# Patient Record
Sex: MALE | Race: ASIAN
[De-identification: names, ages, dates, MRNs, and addresses within clinical notes are randomized per-mention and may not be internally consistent; named-entity substitution may affect disease eponyms.]

---

## 2020-01-31 ENCOUNTER — HOSPITAL ENCOUNTER (OUTPATIENT)
Dept: HOSPITAL 19 - COL.VAS | Age: 85
End: 2020-01-31
Attending: INTERNAL MEDICINE
Payer: MEDICARE

## 2020-01-31 DIAGNOSIS — N18.4: Primary | ICD-10-CM

## 2020-03-18 ENCOUNTER — HOSPITAL ENCOUNTER (OUTPATIENT)
Dept: HOSPITAL 19 - ZCOL.LAB | Age: 85
End: 2020-03-18
Attending: FAMILY MEDICINE
Payer: MEDICARE

## 2020-03-18 DIAGNOSIS — N18.4: Primary | ICD-10-CM

## 2020-03-18 LAB
ALBUMIN SERPL-MCNC: 3.5 GM/DL (ref 3.5–5)
ANION GAP SERPL CALC-SCNC: 16 MMOL/L (ref 7–16)
BASOPHILS # BLD: 0 10*3/UL (ref 0–0.2)
BASOPHILS NFR BLD AUTO: 0.3 % (ref 0–2)
BUN SERPL-MCNC: 127 MG/DL (ref 9–20)
CALCIUM SERPL-MCNC: 8.5 MG/DL (ref 8.4–10.2)
CHLORIDE SERPL-SCNC: 100 MMOL/L (ref 98–107)
CO2 SERPL-SCNC: 19 MMOL/L (ref 22–30)
CREAT SERPL-SCNC: 2.86 UMOL/L (ref 0.66–1.25)
EOSINOPHIL # BLD: 0.6 10*3/UL (ref 0–0.7)
EOSINOPHIL NFR BLD: 7.8 % (ref 0–4)
ERYTHROCYTE [DISTWIDTH] IN BLOOD BY AUTOMATED COUNT: 13.6 % (ref 11.5–14.5)
GLUCOSE SERPL-MCNC: 109 MG/DL (ref 74–106)
GRANULOCYTES # BLD AUTO: 76.8 % (ref 42.2–75.2)
HCT VFR BLD AUTO: 24.7 % (ref 42–52)
HGB BLD-MCNC: 8.3 G/DL (ref 13.5–18)
LYMPHOCYTES # BLD: 0.5 10*3/UL (ref 1.2–3.4)
LYMPHOCYTES NFR BLD: 6.3 % (ref 20–51)
MCH RBC QN AUTO: 32 PG (ref 27–31)
MCHC RBC AUTO-ENTMCNC: 34 G/DL (ref 33–37)
MCV RBC AUTO: 95 FL (ref 80–100)
MONOCYTES # BLD: 0.6 10*3/UL (ref 0.1–0.6)
MONOCYTES NFR BLD AUTO: 8.5 % (ref 1.7–9.3)
NEUTROPHILS # BLD: 5.6 10*3/UL (ref 1.4–6.5)
PHOSPHATE SERPL-MCNC: 6.4 MG/DL (ref 2.5–4.5)
PLATELET # BLD AUTO: 198 K/MM3 (ref 130–400)
PMV BLD AUTO: 11.4 FL (ref 7.4–10.4)
POTASSIUM SERPL-SCNC: 2.9 MMOL/L (ref 3.4–5)
RBC # BLD AUTO: 2.59 M/MM3 (ref 4.2–5.6)
SODIUM SERPL-SCNC: 135 MMOL/L (ref 137–145)

## 2020-04-20 ENCOUNTER — HOSPITAL ENCOUNTER (OUTPATIENT)
Dept: HOSPITAL 19 - EUO | Age: 85
Discharge: HOME | End: 2020-04-20
Attending: INTERNAL MEDICINE
Payer: MEDICARE

## 2020-04-20 VITALS — HEART RATE: 97 BPM | DIASTOLIC BLOOD PRESSURE: 77 MMHG | SYSTOLIC BLOOD PRESSURE: 153 MMHG | TEMPERATURE: 98.2 F

## 2020-04-20 VITALS — BODY MASS INDEX: 16.66 KG/M2 | WEIGHT: 91.71 LBS | HEIGHT: 62.01 IN

## 2020-04-20 DIAGNOSIS — Z79.899: ICD-10-CM

## 2020-04-20 DIAGNOSIS — N18.4: Primary | ICD-10-CM

## 2020-04-20 DIAGNOSIS — D63.1: ICD-10-CM

## 2020-04-20 LAB
HCT VFR BLD AUTO: 27.8 % (ref 42–52)
HGB BLD-MCNC: 9.2 G/DL (ref 13.5–18)

## 2020-05-15 ENCOUNTER — HOSPITAL ENCOUNTER (INPATIENT)
Dept: HOSPITAL 19 - MEDICAL | Age: 85
LOS: 15 days | Discharge: NURSING, REHAB OR LONG-TERM CARE | DRG: 286 | End: 2020-05-30
Attending: INTERNAL MEDICINE | Admitting: INTERNAL MEDICINE
Payer: MEDICARE

## 2020-05-15 VITALS — HEART RATE: 113 BPM | SYSTOLIC BLOOD PRESSURE: 129 MMHG | TEMPERATURE: 97.5 F | DIASTOLIC BLOOD PRESSURE: 69 MMHG

## 2020-05-15 VITALS — WEIGHT: 79.81 LBS | HEIGHT: 62.01 IN | BODY MASS INDEX: 14.5 KG/M2

## 2020-05-15 VITALS — TEMPERATURE: 98.9 F | DIASTOLIC BLOOD PRESSURE: 71 MMHG | HEART RATE: 102 BPM | SYSTOLIC BLOOD PRESSURE: 138 MMHG

## 2020-05-15 VITALS — DIASTOLIC BLOOD PRESSURE: 61 MMHG | HEART RATE: 107 BPM | SYSTOLIC BLOOD PRESSURE: 130 MMHG | TEMPERATURE: 98.1 F

## 2020-05-15 DIAGNOSIS — I13.2: Primary | ICD-10-CM

## 2020-05-15 DIAGNOSIS — I16.0: ICD-10-CM

## 2020-05-15 DIAGNOSIS — E87.2: ICD-10-CM

## 2020-05-15 DIAGNOSIS — I50.20: ICD-10-CM

## 2020-05-15 DIAGNOSIS — E87.6: ICD-10-CM

## 2020-05-15 DIAGNOSIS — E43: ICD-10-CM

## 2020-05-15 DIAGNOSIS — E83.39: ICD-10-CM

## 2020-05-15 DIAGNOSIS — R19.7: ICD-10-CM

## 2020-05-15 DIAGNOSIS — F03.90: ICD-10-CM

## 2020-05-15 DIAGNOSIS — Z66: ICD-10-CM

## 2020-05-15 DIAGNOSIS — N14.0: ICD-10-CM

## 2020-05-15 DIAGNOSIS — N39.0: ICD-10-CM

## 2020-05-15 DIAGNOSIS — N32.0: ICD-10-CM

## 2020-05-15 DIAGNOSIS — N40.1: ICD-10-CM

## 2020-05-15 DIAGNOSIS — R00.0: ICD-10-CM

## 2020-05-15 DIAGNOSIS — I25.5: ICD-10-CM

## 2020-05-15 DIAGNOSIS — R33.8: ICD-10-CM

## 2020-05-15 DIAGNOSIS — N18.6: ICD-10-CM

## 2020-05-15 DIAGNOSIS — Z88.8: ICD-10-CM

## 2020-05-15 DIAGNOSIS — Z87.891: ICD-10-CM

## 2020-05-15 DIAGNOSIS — I42.8: ICD-10-CM

## 2020-05-15 DIAGNOSIS — R79.89: ICD-10-CM

## 2020-05-15 DIAGNOSIS — I08.3: ICD-10-CM

## 2020-05-15 DIAGNOSIS — E87.70: ICD-10-CM

## 2020-05-15 DIAGNOSIS — I25.119: ICD-10-CM

## 2020-05-15 DIAGNOSIS — E87.1: ICD-10-CM

## 2020-05-15 PROCEDURE — C1894 INTRO/SHEATH, NON-LASER: HCPCS

## 2020-05-15 PROCEDURE — 4A023N7 MEASUREMENT OF CARDIAC SAMPLING AND PRESSURE, LEFT HEART, PERCUTANEOUS APPROACH: ICD-10-PCS

## 2020-05-15 PROCEDURE — C1769 GUIDE WIRE: HCPCS

## 2020-05-15 PROCEDURE — A9500 TC99M SESTAMIBI: HCPCS

## 2020-05-15 PROCEDURE — C1760 CLOSURE DEV, VASC: HCPCS

## 2020-05-15 PROCEDURE — B2111ZZ FLUOROSCOPY OF MULTIPLE CORONARY ARTERIES USING LOW OSMOLAR CONTRAST: ICD-10-PCS

## 2020-05-15 NOTE — NUR
Received [facility] from [facility].  Patient was
brought to room via EMS.  Did speak with son Ananth regarding patients care.
He did go to ER admissions to sign paperwork for his father.  Hearing aid sent
up for patient.  5 page reviewed with patient.  He  did state that we could
call him at any time for assistance with translation or confusion.  Fall
precautions initiated for patient.  Call light is provided.

## 2020-05-15 NOTE — NUR
Initial shift assessment done- has been resting well for the past couple
hours- does not speak much english but this nurse did call pts son to make
sure we had his meds correct in the system and to talk with his dad- patient
denies
pain at this this time- was able to say needed some water etc- o2 sats 92 % on
2.5L/nc, Lawrence with clear yellow urine-

## 2020-05-16 VITALS — SYSTOLIC BLOOD PRESSURE: 126 MMHG | DIASTOLIC BLOOD PRESSURE: 53 MMHG | HEART RATE: 114 BPM | TEMPERATURE: 97.3 F

## 2020-05-16 VITALS — TEMPERATURE: 98.3 F | DIASTOLIC BLOOD PRESSURE: 52 MMHG | HEART RATE: 116 BPM | SYSTOLIC BLOOD PRESSURE: 120 MMHG

## 2020-05-16 VITALS — DIASTOLIC BLOOD PRESSURE: 55 MMHG | HEART RATE: 112 BPM | SYSTOLIC BLOOD PRESSURE: 117 MMHG

## 2020-05-16 VITALS — DIASTOLIC BLOOD PRESSURE: 63 MMHG | HEART RATE: 118 BPM | SYSTOLIC BLOOD PRESSURE: 145 MMHG | TEMPERATURE: 98.6 F

## 2020-05-16 VITALS — HEART RATE: 114 BPM | DIASTOLIC BLOOD PRESSURE: 61 MMHG | SYSTOLIC BLOOD PRESSURE: 141 MMHG | TEMPERATURE: 98.4 F

## 2020-05-16 VITALS — TEMPERATURE: 98.7 F | HEART RATE: 120 BPM | DIASTOLIC BLOOD PRESSURE: 69 MMHG | SYSTOLIC BLOOD PRESSURE: 148 MMHG

## 2020-05-16 VITALS — SYSTOLIC BLOOD PRESSURE: 125 MMHG | DIASTOLIC BLOOD PRESSURE: 52 MMHG | HEART RATE: 114 BPM

## 2020-05-16 LAB
ALBUMIN SERPL-MCNC: 3.5 GM/DL (ref 3.5–5)
ANION GAP SERPL CALC-SCNC: 17 MMOL/L (ref 7–16)
BASOPHILS # BLD: 0 10*3/UL (ref 0–0.2)
BASOPHILS NFR BLD AUTO: 0.1 % (ref 0–2)
BUN SERPL-MCNC: 144 MG/DL (ref 9–20)
CALCIUM SERPL-MCNC: 8.9 MG/DL (ref 8.4–10.2)
CHLORIDE SERPL-SCNC: 103 MMOL/L (ref 98–107)
CO2 SERPL-SCNC: 15 MMOL/L (ref 22–30)
CREAT SERPL-SCNC: 3.56 UMOL/L (ref 0.66–1.25)
EOSINOPHIL # BLD: 0 10*3/UL (ref 0–0.7)
EOSINOPHIL NFR BLD: 0 % (ref 0–4)
ERYTHROCYTE [DISTWIDTH] IN BLOOD BY AUTOMATED COUNT: 14.2 % (ref 11.5–14.5)
GLUCOSE SERPL-MCNC: 108 MG/DL (ref 74–106)
GRANULOCYTES # BLD AUTO: 89.7 % (ref 42.2–75.2)
HBV SURFACE AB SER QL IA: <2
HCT VFR BLD AUTO: 32.9 % (ref 42–52)
HGB BLD-MCNC: 11.2 G/DL (ref 13.5–18)
IRON SERPL-MCNC: 12 UG/DL (ref 35–150)
LYMPHOCYTES # BLD: 0.3 10*3/UL (ref 1.2–3.4)
LYMPHOCYTES NFR BLD: 3.9 % (ref 20–51)
MCH RBC QN AUTO: 33 PG (ref 27–31)
MCHC RBC AUTO-ENTMCNC: 34 G/DL (ref 33–37)
MCV RBC AUTO: 96 FL (ref 80–100)
MONOCYTES # BLD: 0.5 10*3/UL (ref 0.1–0.6)
MONOCYTES NFR BLD AUTO: 5.9 % (ref 1.7–9.3)
NEUTROPHILS # BLD: 7.6 10*3/UL (ref 1.4–6.5)
PHOSPHATE SERPL-MCNC: 6.4 MG/DL (ref 2.5–4.5)
PLATELET # BLD AUTO: 185 K/MM3 (ref 130–400)
PMV BLD AUTO: 12 FL (ref 7.4–10.4)
POTASSIUM SERPL-SCNC: 2.5 MMOL/L (ref 3.4–5)
RBC # BLD AUTO: 3.44 M/MM3 (ref 4.2–5.6)
SODIUM SERPL-SCNC: 134 MMOL/L (ref 137–145)
TIBC SERPL-MCNC: 242 UG/DL (ref 261–462)
TRANSFERRIN SERPL-MCNC: 165 MG/DL (ref 180–329)

## 2020-05-16 NOTE — NUR
Quiet night- has been resting well, Left fistula site w/incision
intact, no drainage-slight
redness, good thrill,bruit.pulse. O2 at 2.5L/nc,sats 93-95% tele on, has been
tachy about 106-114 throughout the night- good output per Lawrence

## 2020-05-16 NOTE — NUR
Resting comfortably--  did drink Nephro drink- HOB up, on 3L/nc sats 93%,  now
would like to go back on Bipap- respiratory here to put on, sats 97% on Bipap,
no ther requests per pt, left wrist fistula incision intact, slight redness
noted, good thrill/bruit/pulse. Vitals stable 125/52,112,18, Tele on, Bumex
drip at 0.5mg/hr-- will start another INT to give the Nulecit infusion that
was
ordered.

## 2020-05-16 NOTE — NUR
Report given to oncoming shift.  Called son to give an update.  Assisted
patient with bed repositioning for supper.

## 2020-05-16 NOTE — NUR
When in patients room, pt pointing to chest- rubbing chest,, Tele on, with no
changes, vitals stable at 115/55,112,16- on Bipap with sats 96%,, Dr Clemente
called - when I talked with pt more it is more of a "hard to breathe " than
actual chest pain--  Pt also incontinent of loose stool at this time--
cleaned up pt, and gave a Tylenol as ordered, HOB up, respiratory here to get
him back on Biapap, Pt resting comfortably-falling back to sleep. Will put in
an order for CXR in AM per Dr. Clemente. Continues with Bumex drip of
0.5mg/hr-good output per Lawrence.

## 2020-05-16 NOTE — NUR
Patient rang call light, notified staff that he couldnt breathe.  He was noted
to have audible wheezing from doorway and was observed to be tachypnec.  Call
was placed to Dr. lCemente who instructed to stop fluids, administer IV lasix
and PO PRN hydralazine for reported blood pressure.  This was adminsitered at
0830 after placing call to Dr. Clemente at 0820, orders were read back.  0910,
Dr. Clemente was called again notifying him that condition had not changed.
Urine output had not increased, work of breathing remained the same and heart
rate was in the 140s.  Did receive order for dilaudid for air hunger, bumex
gtt, IV hydralazine.  Did request use of Bi-pap as I had consulted with RT and
he agreed.  Dilaudid and hydralazine were adminstered at 0915 and bi-pap
initiated at 0920.  VS were obtained again at 0930 and have improved, patient
is more relaxed and eyes are closed.  RT remains at bedside to monitor.  1030,
vs have improved from last check.  I have remained in contact with son to give
updates.  I did request a code status for patient and son reports that patient
is a full code.  Did notifiy Dr. Clemente of this and he instructed me to place
the order for this.

## 2020-05-17 VITALS — TEMPERATURE: 97.6 F | HEART RATE: 102 BPM | SYSTOLIC BLOOD PRESSURE: 104 MMHG | DIASTOLIC BLOOD PRESSURE: 52 MMHG

## 2020-05-17 VITALS — TEMPERATURE: 98.3 F | HEART RATE: 110 BPM | SYSTOLIC BLOOD PRESSURE: 123 MMHG | DIASTOLIC BLOOD PRESSURE: 57 MMHG

## 2020-05-17 VITALS — SYSTOLIC BLOOD PRESSURE: 110 MMHG | TEMPERATURE: 99.2 F | HEART RATE: 73 BPM | DIASTOLIC BLOOD PRESSURE: 58 MMHG

## 2020-05-17 VITALS — DIASTOLIC BLOOD PRESSURE: 57 MMHG | TEMPERATURE: 98.9 F | HEART RATE: 97 BPM | SYSTOLIC BLOOD PRESSURE: 109 MMHG

## 2020-05-17 VITALS — TEMPERATURE: 97.8 F | DIASTOLIC BLOOD PRESSURE: 53 MMHG | HEART RATE: 96 BPM | SYSTOLIC BLOOD PRESSURE: 122 MMHG

## 2020-05-17 VITALS — TEMPERATURE: 97.6 F | SYSTOLIC BLOOD PRESSURE: 129 MMHG | HEART RATE: 110 BPM | DIASTOLIC BLOOD PRESSURE: 61 MMHG

## 2020-05-17 VITALS — HEART RATE: 109 BPM | DIASTOLIC BLOOD PRESSURE: 65 MMHG | SYSTOLIC BLOOD PRESSURE: 119 MMHG | TEMPERATURE: 97.1 F

## 2020-05-17 LAB
ALBUMIN SERPL-MCNC: 3.5 GM/DL (ref 3.5–5)
ANION GAP SERPL CALC-SCNC: 17 MMOL/L (ref 7–16)
BASOPHILS # BLD: 0 10*3/UL (ref 0–0.2)
BASOPHILS NFR BLD AUTO: 0.1 % (ref 0–2)
BUN SERPL-MCNC: 165 MG/DL (ref 9–20)
CALCIUM SERPL-MCNC: 8.9 MG/DL (ref 8.4–10.2)
CHLORIDE SERPL-SCNC: 102 MMOL/L (ref 98–107)
CO2 SERPL-SCNC: 16 MMOL/L (ref 22–30)
CREAT SERPL-SCNC: 3.95 UMOL/L (ref 0.66–1.25)
EOSINOPHIL # BLD: 0 10*3/UL (ref 0–0.7)
EOSINOPHIL NFR BLD: 0 % (ref 0–4)
ERYTHROCYTE [DISTWIDTH] IN BLOOD BY AUTOMATED COUNT: 14.2 % (ref 11.5–14.5)
GLUCOSE SERPL-MCNC: 146 MG/DL (ref 74–106)
GRANULOCYTES # BLD AUTO: 89 % (ref 42.2–75.2)
HCT VFR BLD AUTO: 33.6 % (ref 42–52)
HGB BLD-MCNC: 11.5 G/DL (ref 13.5–18)
LYMPHOCYTES # BLD: 0.3 10*3/UL (ref 1.2–3.4)
LYMPHOCYTES NFR BLD: 3.1 % (ref 20–51)
MCH RBC QN AUTO: 32 PG (ref 27–31)
MCHC RBC AUTO-ENTMCNC: 34 G/DL (ref 33–37)
MCV RBC AUTO: 94 FL (ref 80–100)
MONOCYTES # BLD: 0.7 10*3/UL (ref 0.1–0.6)
MONOCYTES NFR BLD AUTO: 7.5 % (ref 1.7–9.3)
NEUTROPHILS # BLD: 7.9 10*3/UL (ref 1.4–6.5)
PHOSPHATE SERPL-MCNC: 7.3 MG/DL (ref 2.5–4.5)
PLATELET # BLD AUTO: 213 K/MM3 (ref 130–400)
PMV BLD AUTO: 12.1 FL (ref 7.4–10.4)
POTASSIUM SERPL-SCNC: 2.9 MMOL/L (ref 3.4–5)
RBC # BLD AUTO: 3.56 M/MM3 (ref 4.2–5.6)
SODIUM SERPL-SCNC: 135 MMOL/L (ref 137–145)

## 2020-05-17 NOTE — NUR
Received report from Annie at beginning of the shift, was awake and alert in
room during rounds.  He states he feels much better.  Breakfast tray later
delivered and patient stated he does not eat, only drinks milk and water.  Did
turn down the food.  Does accept Nepro supplements.  Oral medications
administered and were taken without problem.  Potassium is currently being
replaced.

## 2020-05-17 NOTE — NUR
Report given to night shift nurse.  Patient has had uneventful shift.  Did
once complain of pain from the ohara and he wished to have it removed.  I did
explain if it were removed, he would not be able to urinate.  He verbalized
understanding.

## 2020-05-17 NOTE — NUR
Incontinent of moderate loose liquid stool, cleaned up, has been resting well
tonight- on Bipap.sats 97%,, pt states" is feeling better tonight,, Tele on,
heart rate down to /min

## 2020-05-17 NOTE — NUR
PT NOT ON BIPAP AS HE IS FEELING MUCH BETTER AND ABLE TO BREATH BETTER. WILL
CONTINUE TO MONITOR AND ASSESS. BIPAP IS IN ROOM AND ON STANDBY IF NEEDED FOR
PT AT NIGHT.

## 2020-05-17 NOTE — NUR
Quiet night-- has been resting quietly , requesting a few sips of water at
times, otherwise sleeping, Bipap on

## 2020-05-18 VITALS — HEART RATE: 96 BPM | DIASTOLIC BLOOD PRESSURE: 55 MMHG | TEMPERATURE: 98.4 F | SYSTOLIC BLOOD PRESSURE: 124 MMHG

## 2020-05-18 VITALS — TEMPERATURE: 97.5 F | DIASTOLIC BLOOD PRESSURE: 34 MMHG | HEART RATE: 95 BPM | SYSTOLIC BLOOD PRESSURE: 128 MMHG

## 2020-05-18 VITALS — HEART RATE: 105 BPM | SYSTOLIC BLOOD PRESSURE: 138 MMHG | TEMPERATURE: 98 F | DIASTOLIC BLOOD PRESSURE: 52 MMHG

## 2020-05-18 VITALS — DIASTOLIC BLOOD PRESSURE: 37 MMHG | SYSTOLIC BLOOD PRESSURE: 98 MMHG | HEART RATE: 91 BPM | TEMPERATURE: 97.5 F

## 2020-05-18 LAB
ALBUMIN SERPL-MCNC: 3.6 GM/DL (ref 3.5–5)
ANION GAP SERPL CALC-SCNC: 20 MMOL/L (ref 7–16)
APTT PPP: 34 SECONDS (ref 26–37)
BUN SERPL-MCNC: 180 MG/DL (ref 9–20)
BURR CELLS BLD QL SMEAR: (no result)
CALCIUM SERPL-MCNC: 9 MG/DL (ref 8.4–10.2)
CHLORIDE SERPL-SCNC: 99 MMOL/L (ref 98–107)
CO2 SERPL-SCNC: 16 MMOL/L (ref 22–30)
CREAT SERPL-SCNC: 4.45 UMOL/L (ref 0.66–1.25)
ERYTHROCYTE [DISTWIDTH] IN BLOOD BY AUTOMATED COUNT: 14.1 % (ref 11.5–14.5)
GLUCOSE SERPL-MCNC: 138 MG/DL (ref 74–106)
HCT VFR BLD AUTO: 36 % (ref 42–52)
HGB BLD-MCNC: 12.3 G/DL (ref 13.5–18)
INR BLD: 1.2 (ref 0.8–3)
LYMPHOCYTES NFR BLD MANUAL: 4 % (ref 20–51)
MCH RBC QN AUTO: 32 PG (ref 27–31)
MCHC RBC AUTO-ENTMCNC: 34 G/DL (ref 33–37)
MCV RBC AUTO: 94 FL (ref 80–100)
MONOCYTES NFR BLD: 1 % (ref 1.7–9.3)
NEUTS BAND NFR BLD: 3 % (ref 0–10)
NEUTS SEG NFR BLD MANUAL: 92 % (ref 42–75.2)
OVALOCYTES BLD QL SMEAR: (no result)
PHOSPHATE SERPL-MCNC: 7.8 MG/DL (ref 2.5–4.5)
PLATELET # BLD AUTO: 251 K/MM3 (ref 130–400)
PLATELET BLD QL SMEAR: NORMAL
PMV BLD AUTO: 12.2 FL (ref 7.4–10.4)
POTASSIUM SERPL-SCNC: 3.1 MMOL/L (ref 3.4–5)
PROTHROMBIN TIME: 13.5 SECONDS (ref 9.7–12.8)
RBC # BLD AUTO: 3.82 M/MM3 (ref 4.2–5.6)
SODIUM SERPL-SCNC: 135 MMOL/L (ref 137–145)

## 2020-05-18 PROCEDURE — B5181ZA FLUOROSCOPY OF SUPERIOR VENA CAVA USING LOW OSMOLAR CONTRAST, GUIDANCE: ICD-10-PCS | Performed by: SURGERY

## 2020-05-18 PROCEDURE — 02HV33Z INSERTION OF INFUSION DEVICE INTO SUPERIOR VENA CAVA, PERCUTANEOUS APPROACH: ICD-10-PCS | Performed by: SURGERY

## 2020-05-18 PROCEDURE — 0JH63XZ INSERTION OF TUNNELED VASCULAR ACCESS DEVICE INTO CHEST SUBCUTANEOUS TISSUE AND FASCIA, PERCUTANEOUS APPROACH: ICD-10-PCS | Performed by: SURGERY

## 2020-05-18 NOTE — NUR
spoke with CIARRA Jarquin who advised patient has been confused and
that his son, Suleiman (ph#[phone]) is the best point of contact. SW
contacted Suleiman who advised patient lives in [place] with his wife, Shree Lema
(ph#[phone]). Patient sees Dr. Caldwell for primary care and Bill picks
up patient's medications from [facility]. Suleiman reports he assists patient
and his wife with setting up their medications in a pill organizer. Suleiman
reports patient takes his medications independently once set up in the
organizer. Suleiman reports he checks in on patient and his wife multiple times
daily and helps patient's wife with meals. Suleiman reports patient is normally
very mobile. Suleiman reports he is patient's DPOA and that he will bring in
paperwork as soon as possible. uSleiman reports patient's first language is
Mandarin Chinese but that he normally speaks fluent English as well. Suleiman
advised that as a result of patient's dementia, he often prefers to speak his
first language. Suleiman is trying to locate patient's second hearing aide to
assist with communication. Suleiman advised having an  may be more
beneficial for patient at this time or call Suleiman on speaker phone to speak
Mandarin Chinese to patient. Suleiman advised patient does not have home oxygen at
this time but may need it upon discharge. Suleiman advised he has already been in
contact with [employer] about setting up home oxygen. SW to continue to
monitor. Suleiman also reports patient and his wife have [facility] although currently it is patient's wife that is receiving PT/OT/ST.
Suleiman advised he is in the process of setting up private duty services through
[facility]. Suleiman advised plan is for patient to return home upon discharge
with [facility]. SW to continue to follow.

## 2020-05-18 NOTE — NUR
Up to bedside commode and returned to bed. Assessment complete. Lungs clear.
Heart sounds normal. Bowels active. Pulses strong throughout. No edema noted.
Bumex gtt to right upper arm without complications. Right chest dialysis cath
CDI. Left forearm fistula bruit and thrill present. Reports pain at site.
Provided with PRN tylenol. Denies needs at this time. Call light in reach.
Will monitor.

## 2020-05-18 NOTE — NUR
Patient noted to call out several times asking about his bags, pants, etc.
Patient then pulled out one of his IV's. The bed alarm on his bed wasn't
working, so patient was moved to a closer room to the nurses station. IV
to right forearm patent. IV bumex continues. New AV Fistula noted to left
forearm. Incision edges well approximated. Patient is hard of hearing. Noted
to continue on 3L of oxygen until respiratory therapy put patient on BiPap.
Patient has been NPO since midnight. Will continue to monitor.

## 2020-05-19 VITALS — TEMPERATURE: 97.6 F | HEART RATE: 104 BPM | SYSTOLIC BLOOD PRESSURE: 117 MMHG | DIASTOLIC BLOOD PRESSURE: 42 MMHG

## 2020-05-19 VITALS — SYSTOLIC BLOOD PRESSURE: 118 MMHG | TEMPERATURE: 97.3 F | DIASTOLIC BLOOD PRESSURE: 95 MMHG | HEART RATE: 96 BPM

## 2020-05-19 VITALS — SYSTOLIC BLOOD PRESSURE: 130 MMHG | TEMPERATURE: 98.1 F | HEART RATE: 105 BPM | DIASTOLIC BLOOD PRESSURE: 39 MMHG

## 2020-05-19 VITALS — HEART RATE: 101 BPM | SYSTOLIC BLOOD PRESSURE: 122 MMHG | TEMPERATURE: 98.8 F | DIASTOLIC BLOOD PRESSURE: 44 MMHG

## 2020-05-19 LAB
ALBUMIN SERPL-MCNC: 3.3 GM/DL (ref 3.5–5)
ANION GAP SERPL CALC-SCNC: 12 MMOL/L (ref 7–16)
BASOPHILS # BLD: 0 10*3/UL (ref 0–0.2)
BASOPHILS NFR BLD AUTO: 0.1 % (ref 0–2)
BUN SERPL-MCNC: 120 MG/DL (ref 9–20)
CALCIUM SERPL-MCNC: 8.8 MG/DL (ref 8.4–10.2)
CHLORIDE SERPL-SCNC: 101 MMOL/L (ref 98–107)
CO2 SERPL-SCNC: 24 MMOL/L (ref 22–30)
CREAT SERPL-SCNC: 3.44 UMOL/L (ref 0.66–1.25)
EOSINOPHIL # BLD: 0 10*3/UL (ref 0–0.7)
EOSINOPHIL NFR BLD: 0.1 % (ref 0–4)
ERYTHROCYTE [DISTWIDTH] IN BLOOD BY AUTOMATED COUNT: 14.2 % (ref 11.5–14.5)
GLUCOSE SERPL-MCNC: 126 MG/DL (ref 74–106)
GRANULOCYTES # BLD AUTO: 87.7 % (ref 42.2–75.2)
HCT VFR BLD AUTO: 33 % (ref 42–52)
HGB BLD-MCNC: 11.3 G/DL (ref 13.5–18)
LYMPHOCYTES # BLD: 0.3 10*3/UL (ref 1.2–3.4)
LYMPHOCYTES NFR BLD: 3.4 % (ref 20–51)
MCH RBC QN AUTO: 33 PG (ref 27–31)
MCHC RBC AUTO-ENTMCNC: 34 G/DL (ref 33–37)
MCV RBC AUTO: 95 FL (ref 80–100)
MONOCYTES # BLD: 0.8 10*3/UL (ref 0.1–0.6)
MONOCYTES NFR BLD AUTO: 8.3 % (ref 1.7–9.3)
NEUTROPHILS # BLD: 8.7 10*3/UL (ref 1.4–6.5)
PHOSPHATE SERPL-MCNC: 5.4 MG/DL (ref 2.5–4.5)
PLATELET # BLD AUTO: 236 K/MM3 (ref 130–400)
PMV BLD AUTO: 12.1 FL (ref 7.4–10.4)
POTASSIUM SERPL-SCNC: 2.9 MMOL/L (ref 3.4–5)
RBC # BLD AUTO: 3.46 M/MM3 (ref 4.2–5.6)
SODIUM SERPL-SCNC: 137 MMOL/L (ref 137–145)

## 2020-05-19 PROCEDURE — 5A1D70Z PERFORMANCE OF URINARY FILTRATION, INTERMITTENT, LESS THAN 6 HOURS PER DAY: ICD-10-PCS | Performed by: INTERNAL MEDICINE

## 2020-05-19 NOTE — NUR
Resting in bed. Assessment complete. Lungs clear. Heart sounds normal. Bowels
active x4. Pulses present throughout. No edema noted at this time. INT right
upper arm flushed without complications. Denies pain. Denies needs at this
time. Call light in reach. Will monitor.

## 2020-05-19 NOTE — NUR
Patient remained on bumex gtt throughout night. Uneventful night. Resting in
bed this AM. Call light in reach.

## 2020-05-19 NOTE — NUR
Patient had incontienent episode of diarrhea. Provided with PRN immodium at
patient request. Cares provided along with ohara care.

## 2020-05-19 NOTE — NUR
Patient reports chest pain. Blood pressure 98/34. Spoke with Dr. Clemente-observe at this time. Gave PRN order for dilaudid 0.25 to 0.5mg Q2H but
use carefully with low blood pressures. Patient updated. Will monitor.

## 2020-05-19 NOTE — NUR
Assessment completed, alert/oriented, vital signs stable, denies pain, heart
RRR, lungs CTA, new left arm fistula +bruit/thrill, right jugular HD cath site
looks good/ no redness or oozing noted, ohara patent with clear urine/ Urology
notified this morning of consultation for outlet obstruction, patient has
eaten small amount of breakfast, will be going to dialysis this morning,
denies other needs at this time

## 2020-05-20 VITALS — TEMPERATURE: 97.9 F | DIASTOLIC BLOOD PRESSURE: 39 MMHG | HEART RATE: 95 BPM | SYSTOLIC BLOOD PRESSURE: 125 MMHG

## 2020-05-20 VITALS — SYSTOLIC BLOOD PRESSURE: 116 MMHG | DIASTOLIC BLOOD PRESSURE: 49 MMHG | HEART RATE: 74 BPM

## 2020-05-20 VITALS — SYSTOLIC BLOOD PRESSURE: 120 MMHG | DIASTOLIC BLOOD PRESSURE: 39 MMHG | HEART RATE: 88 BPM

## 2020-05-20 VITALS — DIASTOLIC BLOOD PRESSURE: 43 MMHG | TEMPERATURE: 98.4 F | SYSTOLIC BLOOD PRESSURE: 112 MMHG | HEART RATE: 95 BPM

## 2020-05-20 VITALS — SYSTOLIC BLOOD PRESSURE: 108 MMHG | HEART RATE: 92 BPM | DIASTOLIC BLOOD PRESSURE: 33 MMHG

## 2020-05-20 VITALS — DIASTOLIC BLOOD PRESSURE: 33 MMHG | TEMPERATURE: 98.5 F | HEART RATE: 92 BPM | SYSTOLIC BLOOD PRESSURE: 108 MMHG

## 2020-05-20 VITALS — DIASTOLIC BLOOD PRESSURE: 56 MMHG | HEART RATE: 82 BPM | SYSTOLIC BLOOD PRESSURE: 114 MMHG

## 2020-05-20 VITALS — SYSTOLIC BLOOD PRESSURE: 118 MMHG | HEART RATE: 84 BPM | DIASTOLIC BLOOD PRESSURE: 58 MMHG

## 2020-05-20 VITALS — HEART RATE: 81 BPM | DIASTOLIC BLOOD PRESSURE: 53 MMHG | SYSTOLIC BLOOD PRESSURE: 110 MMHG

## 2020-05-20 VITALS — TEMPERATURE: 98.1 F | SYSTOLIC BLOOD PRESSURE: 135 MMHG | HEART RATE: 92 BPM | DIASTOLIC BLOOD PRESSURE: 52 MMHG

## 2020-05-20 VITALS — HEART RATE: 91 BPM | TEMPERATURE: 97.8 F | SYSTOLIC BLOOD PRESSURE: 116 MMHG | DIASTOLIC BLOOD PRESSURE: 69 MMHG

## 2020-05-20 VITALS — SYSTOLIC BLOOD PRESSURE: 114 MMHG | DIASTOLIC BLOOD PRESSURE: 60 MMHG | HEART RATE: 89 BPM

## 2020-05-20 VITALS — HEART RATE: 81 BPM | SYSTOLIC BLOOD PRESSURE: 119 MMHG | DIASTOLIC BLOOD PRESSURE: 54 MMHG

## 2020-05-20 VITALS — SYSTOLIC BLOOD PRESSURE: 139 MMHG | DIASTOLIC BLOOD PRESSURE: 41 MMHG

## 2020-05-20 LAB
ALBUMIN SERPL-MCNC: 3.2 GM/DL (ref 3.5–5)
ANION GAP SERPL CALC-SCNC: 9 MMOL/L (ref 7–16)
BASOPHILS # BLD: 0 10*3/UL (ref 0–0.2)
BASOPHILS NFR BLD AUTO: 0.2 % (ref 0–2)
BUN SERPL-MCNC: 68 MG/DL (ref 9–20)
CALCIUM SERPL-MCNC: 8.3 MG/DL (ref 8.4–10.2)
CHLORIDE SERPL-SCNC: 103 MMOL/L (ref 98–107)
CO2 SERPL-SCNC: 24 MMOL/L (ref 22–30)
CREAT SERPL-SCNC: 2.87 UMOL/L (ref 0.66–1.25)
EOSINOPHIL # BLD: 0.1 10*3/UL (ref 0–0.7)
EOSINOPHIL NFR BLD: 0.7 % (ref 0–4)
ERYTHROCYTE [DISTWIDTH] IN BLOOD BY AUTOMATED COUNT: 14.4 % (ref 11.5–14.5)
GLUCOSE SERPL-MCNC: 121 MG/DL (ref 74–106)
GRANULOCYTES # BLD AUTO: 82.2 % (ref 42.2–75.2)
HCT VFR BLD AUTO: 33.6 % (ref 42–52)
HGB BLD-MCNC: 11.3 G/DL (ref 13.5–18)
LYMPHOCYTES # BLD: 0.5 10*3/UL (ref 1.2–3.4)
LYMPHOCYTES NFR BLD: 6.1 % (ref 20–51)
MCH RBC QN AUTO: 33 PG (ref 27–31)
MCHC RBC AUTO-ENTMCNC: 34 G/DL (ref 33–37)
MCV RBC AUTO: 98 FL (ref 80–100)
MONOCYTES # BLD: 0.9 10*3/UL (ref 0.1–0.6)
MONOCYTES NFR BLD AUTO: 10.4 % (ref 1.7–9.3)
NEUTROPHILS # BLD: 6.8 10*3/UL (ref 1.4–6.5)
PHOSPHATE SERPL-MCNC: 4.5 MG/DL (ref 2.5–4.5)
PLATELET # BLD AUTO: 194 K/MM3 (ref 130–400)
PMV BLD AUTO: 12.1 FL (ref 7.4–10.4)
POTASSIUM SERPL-SCNC: 3.6 MMOL/L (ref 3.4–5)
RBC # BLD AUTO: 3.43 M/MM3 (ref 4.2–5.6)
SODIUM SERPL-SCNC: 135 MMOL/L (ref 137–145)

## 2020-05-20 NOTE — NUR
Patient is still off the floor and will go straight from stress test to
Express unit to have his FELECIA done

## 2020-05-20 NOTE — NUR
Patient arrived back from FELECIA at this time, alert/oriented, vital signs
stable, denies needs ambulatory

## 2020-05-20 NOTE — NUR
Resting in bed. Assessment complete. Lungs clear. Heart murmur heard. Bowels
active x4. Pulses strong throughout. No edema noted. INT right upper arm
flushed without complications. Right chest dialysis access-CDI. Left forearm
fistula present-bruit and thrill. Denies pain. Lawrence to dependent drainage.
Clear yellow urine. Denies needs at this time. Call light in reach.

## 2020-05-20 NOTE — NUR
Patient says heart hurting. Points to epigastric/ABD region. BP systolic
continues to be 102. Concerned with giving dilaudid. Patient suggested tylenol
or aspirin. Given tylenol. Will monitor

## 2020-05-20 NOTE — NUR
Patient had episodes of chest pain throughout night. Resolved with tylenol and
redirection. Patient voiced concern with "bad heart." Otherwise uneventful
night. Resting in bed this AM. Call light in reach.

## 2020-05-21 VITALS — TEMPERATURE: 97.5 F | SYSTOLIC BLOOD PRESSURE: 97 MMHG | DIASTOLIC BLOOD PRESSURE: 22 MMHG | HEART RATE: 79 BPM

## 2020-05-21 VITALS — SYSTOLIC BLOOD PRESSURE: 110 MMHG | DIASTOLIC BLOOD PRESSURE: 29 MMHG | HEART RATE: 77 BPM | TEMPERATURE: 98.6 F

## 2020-05-21 VITALS — HEART RATE: 84 BPM | DIASTOLIC BLOOD PRESSURE: 36 MMHG | SYSTOLIC BLOOD PRESSURE: 102 MMHG | TEMPERATURE: 97.3 F

## 2020-05-21 VITALS — HEART RATE: 86 BPM | TEMPERATURE: 97.3 F | SYSTOLIC BLOOD PRESSURE: 136 MMHG | DIASTOLIC BLOOD PRESSURE: 32 MMHG

## 2020-05-21 VITALS — DIASTOLIC BLOOD PRESSURE: 27 MMHG | TEMPERATURE: 97.7 F | SYSTOLIC BLOOD PRESSURE: 117 MMHG | HEART RATE: 76 BPM

## 2020-05-21 LAB
ALBUMIN SERPL-MCNC: 3.3 GM/DL (ref 3.5–5)
ANION GAP SERPL CALC-SCNC: 13 MMOL/L (ref 7–16)
BASOPHILS # BLD: 0 10*3/UL (ref 0–0.2)
BASOPHILS NFR BLD AUTO: 0.1 % (ref 0–2)
BUN SERPL-MCNC: 95 MG/DL (ref 9–20)
CALCIUM SERPL-MCNC: 8.6 MG/DL (ref 8.4–10.2)
CHLORIDE SERPL-SCNC: 102 MMOL/L (ref 98–107)
CO2 SERPL-SCNC: 22 MMOL/L (ref 22–30)
CREAT SERPL-SCNC: 3.75 UMOL/L (ref 0.66–1.25)
EOSINOPHIL # BLD: 0.1 10*3/UL (ref 0–0.7)
EOSINOPHIL NFR BLD: 0.8 % (ref 0–4)
ERYTHROCYTE [DISTWIDTH] IN BLOOD BY AUTOMATED COUNT: 13.9 % (ref 11.5–14.5)
GLUCOSE SERPL-MCNC: 83 MG/DL (ref 74–106)
GRANULOCYTES # BLD AUTO: 85.6 % (ref 42.2–75.2)
HCT VFR BLD AUTO: 35.7 % (ref 42–52)
HGB BLD-MCNC: 11.8 G/DL (ref 13.5–18)
LYMPHOCYTES # BLD: 0.5 10*3/UL (ref 1.2–3.4)
LYMPHOCYTES NFR BLD: 5.3 % (ref 20–51)
MCH RBC QN AUTO: 33 PG (ref 27–31)
MCHC RBC AUTO-ENTMCNC: 33 G/DL (ref 33–37)
MCV RBC AUTO: 99 FL (ref 80–100)
MONOCYTES # BLD: 0.8 10*3/UL (ref 0.1–0.6)
MONOCYTES NFR BLD AUTO: 7.9 % (ref 1.7–9.3)
NEUTROPHILS # BLD: 8.7 10*3/UL (ref 1.4–6.5)
PHOSPHATE SERPL-MCNC: 7 MG/DL (ref 2.5–4.5)
PLATELET # BLD AUTO: 206 K/MM3 (ref 130–400)
PMV BLD AUTO: 12.4 FL (ref 7.4–10.4)
POTASSIUM SERPL-SCNC: 3.7 MMOL/L (ref 3.4–5)
RBC # BLD AUTO: 3.61 M/MM3 (ref 4.2–5.6)
SODIUM SERPL-SCNC: 137 MMOL/L (ref 137–145)

## 2020-05-21 NOTE — NUR
followed up with Suleiman to review discharge plan. DONTRELL reviewed
PT/OT notes with Suleiman along with options for post acute rehab. Suleiman discussed
that patient's wife has [facility] and it has been recommended she
have 24 hour supervision and support. Suleiman advised he has been with his mom as
much as possible and is working with [facility] to obtain additional
services in home. Suleiman would like to speak with patient's primary care
physician, Dr. Caldwell to get his input on post acute rehab options. DONTRELL
discussed options including Inpatient Rehab and SNFs. Swing Bed would not be
an option for patient at this time as he requires dialysis. Suleiman reports he
will follow up with DONTRELL on where to send referrals. DONTRELL contacted Hanna at
[facility] to provide update. Hanna advised they have a social
services consult in for patient's wife and they are looking into more private
duty services for her as they do not feel she should be home alone at this
time. DONTRELL to continue to follow.

## 2020-05-21 NOTE — NUR
Assessment complete. Resting in bed. C/O chest pain. Previous nurse admin PRN
acetaminophen @1730. Nephro shake given. Denies other needs at this time.

## 2020-05-21 NOTE — NUR
Patient is resting in bed, breakfast tray is available.  States he can't eat
as he doesn't have his teeth.  He did have oatmeal on his tray and when shown
nodded his head as if he could eat that.  He declined the ground sausage,
gravy and scrambled eggs.  States he can only drink.  Was offered nepro and
ice water and he did start to drink the supplement.  Received call from
dialysis nurse who is ready for patient to have his treatment.  Was assisted
to [facility] and taken to dialysis at that time.

## 2020-05-21 NOTE — NUR
Patient delivered dinner tray, encouraged soft foods like mashed potatoes,
will take small bites.  Denies pain after receiving APAP.  Consent for heart
cath scheduled for tomorrow received from son, Ananth Pearson, VIA phone and
verbally consented to a second nurse as well.

## 2020-05-21 NOTE — NUR
Received call at 1030 regarding a critical troponin of 0.634, notified Dr Clemente who instructed me to reach out to cardiology which I did speak to
Jeni.  She said Dr. Dsouza will still continue with heparin and serial
troponins would be monitored.  1400 troponin has decreased to 0.568.

## 2020-05-22 VITALS — SYSTOLIC BLOOD PRESSURE: 106 MMHG | DIASTOLIC BLOOD PRESSURE: 43 MMHG | HEART RATE: 73 BPM

## 2020-05-22 VITALS — HEART RATE: 83 BPM | TEMPERATURE: 98.3 F | SYSTOLIC BLOOD PRESSURE: 120 MMHG | DIASTOLIC BLOOD PRESSURE: 51 MMHG

## 2020-05-22 VITALS — HEART RATE: 82 BPM | SYSTOLIC BLOOD PRESSURE: 123 MMHG | TEMPERATURE: 98.1 F | DIASTOLIC BLOOD PRESSURE: 51 MMHG

## 2020-05-22 VITALS — HEART RATE: 79 BPM | SYSTOLIC BLOOD PRESSURE: 113 MMHG | TEMPERATURE: 98.5 F | DIASTOLIC BLOOD PRESSURE: 47 MMHG

## 2020-05-22 VITALS — DIASTOLIC BLOOD PRESSURE: 47 MMHG | SYSTOLIC BLOOD PRESSURE: 96 MMHG | TEMPERATURE: 98.4 F | HEART RATE: 79 BPM

## 2020-05-22 VITALS — TEMPERATURE: 97.4 F | DIASTOLIC BLOOD PRESSURE: 65 MMHG | SYSTOLIC BLOOD PRESSURE: 125 MMHG | HEART RATE: 91 BPM

## 2020-05-22 VITALS — SYSTOLIC BLOOD PRESSURE: 113 MMHG | DIASTOLIC BLOOD PRESSURE: 48 MMHG | TEMPERATURE: 98 F | HEART RATE: 85 BPM

## 2020-05-22 VITALS — DIASTOLIC BLOOD PRESSURE: 43 MMHG | HEART RATE: 83 BPM | SYSTOLIC BLOOD PRESSURE: 110 MMHG | TEMPERATURE: 98.5 F

## 2020-05-22 VITALS — DIASTOLIC BLOOD PRESSURE: 51 MMHG | HEART RATE: 94 BPM | SYSTOLIC BLOOD PRESSURE: 95 MMHG

## 2020-05-22 VITALS — TEMPERATURE: 98 F | SYSTOLIC BLOOD PRESSURE: 119 MMHG | DIASTOLIC BLOOD PRESSURE: 46 MMHG | HEART RATE: 88 BPM

## 2020-05-22 VITALS — DIASTOLIC BLOOD PRESSURE: 47 MMHG | TEMPERATURE: 97.9 F | HEART RATE: 83 BPM | SYSTOLIC BLOOD PRESSURE: 100 MMHG

## 2020-05-22 VITALS — TEMPERATURE: 98.6 F | HEART RATE: 92 BPM | DIASTOLIC BLOOD PRESSURE: 56 MMHG | SYSTOLIC BLOOD PRESSURE: 114 MMHG

## 2020-05-22 VITALS — DIASTOLIC BLOOD PRESSURE: 43 MMHG | HEART RATE: 82 BPM | SYSTOLIC BLOOD PRESSURE: 112 MMHG | TEMPERATURE: 98 F

## 2020-05-22 VITALS — SYSTOLIC BLOOD PRESSURE: 126 MMHG | DIASTOLIC BLOOD PRESSURE: 52 MMHG | TEMPERATURE: 97.9 F | HEART RATE: 92 BPM

## 2020-05-22 VITALS — TEMPERATURE: 97.5 F | SYSTOLIC BLOOD PRESSURE: 106 MMHG | HEART RATE: 93 BPM | DIASTOLIC BLOOD PRESSURE: 54 MMHG

## 2020-05-22 VITALS — HEART RATE: 86 BPM | TEMPERATURE: 97.6 F | DIASTOLIC BLOOD PRESSURE: 41 MMHG | SYSTOLIC BLOOD PRESSURE: 133 MMHG

## 2020-05-22 VITALS — DIASTOLIC BLOOD PRESSURE: 53 MMHG | SYSTOLIC BLOOD PRESSURE: 121 MMHG | HEART RATE: 83 BPM | TEMPERATURE: 98.3 F

## 2020-05-22 LAB
ANION GAP SERPL CALC-SCNC: 10 MMOL/L (ref 7–16)
APTT PPP: 60.1 SECONDS (ref 26–37)
BUN SERPL-MCNC: 53 MG/DL (ref 9–20)
CALCIUM SERPL-MCNC: 8.3 MG/DL (ref 8.4–10.2)
CHLORIDE SERPL-SCNC: 104 MMOL/L (ref 98–107)
CO2 SERPL-SCNC: 23 MMOL/L (ref 22–30)
CREAT SERPL-SCNC: 2.7 UMOL/L (ref 0.66–1.25)
ERYTHROCYTE [DISTWIDTH] IN BLOOD BY AUTOMATED COUNT: 13.7 % (ref 11.5–14.5)
GLUCOSE SERPL-MCNC: 93 MG/DL (ref 74–106)
HCT VFR BLD AUTO: 35 % (ref 42–52)
HGB BLD-MCNC: 11.6 G/DL (ref 13.5–18)
INR BLD: 1.3 (ref 0.8–3)
MCH RBC QN AUTO: 33 PG (ref 27–31)
MCHC RBC AUTO-ENTMCNC: 33 G/DL (ref 33–37)
MCV RBC AUTO: 99 FL (ref 80–100)
PLATELET # BLD AUTO: 151 K/MM3 (ref 130–400)
PMV BLD AUTO: 12.7 FL (ref 7.4–10.4)
POTASSIUM SERPL-SCNC: 4.2 MMOL/L (ref 3.4–5)
PROTHROMBIN TIME: 14 SECONDS (ref 9.7–12.8)
RBC # BLD AUTO: 3.53 M/MM3 (ref 4.2–5.6)
SODIUM SERPL-SCNC: 136 MMOL/L (ref 137–145)

## 2020-05-22 NOTE — NUR
contacted patient's son, Suleiman to follow up on discharge plan.
Suleiman would like referral sent to [facility]. SW contacted Chelsea at
[facility] and faxed referral. SW also spoke with patient's RN, Delta about
ordering COVID testing which is required by SNF placements. SW to continue to
follow.

## 2020-05-22 NOTE — NUR
SEE MERGE DOCUMENTATION FOR MEDICATION  ADMINISTRATION  AND  INTRA/POST
PROCEDURE SEDATION ASSESSMENTS.

## 2020-05-22 NOTE — NUR
PT RETURNED FROM HEART CATH @ 1215. ACCESS SITE TO RT GROIN, ANGIOSEAL WITH
GAUZE AND TEGADERM PRESENT. DIRED BLOOD PRESENT AROUND DRESSING BUT NO ACTIVE
BLEEDING. PULSES PALPABLE. AOX3. DENIES PAIN. DOC IN HEART CATH CALLED DPOA
SON TO NOTIFY OF RESULTS. PT INSTRUCTED TO LAY FLAT. IV TO RUE INTACT.

## 2020-05-23 VITALS — DIASTOLIC BLOOD PRESSURE: 33 MMHG | TEMPERATURE: 97.5 F | SYSTOLIC BLOOD PRESSURE: 87 MMHG | HEART RATE: 69 BPM

## 2020-05-23 VITALS — HEART RATE: 82 BPM | SYSTOLIC BLOOD PRESSURE: 100 MMHG | DIASTOLIC BLOOD PRESSURE: 47 MMHG | TEMPERATURE: 97.7 F

## 2020-05-23 VITALS — TEMPERATURE: 98 F | DIASTOLIC BLOOD PRESSURE: 42 MMHG | HEART RATE: 65 BPM | SYSTOLIC BLOOD PRESSURE: 104 MMHG

## 2020-05-23 VITALS — HEART RATE: 91 BPM | DIASTOLIC BLOOD PRESSURE: 45 MMHG | TEMPERATURE: 98.3 F | SYSTOLIC BLOOD PRESSURE: 100 MMHG

## 2020-05-23 VITALS — DIASTOLIC BLOOD PRESSURE: 44 MMHG | TEMPERATURE: 97.9 F | SYSTOLIC BLOOD PRESSURE: 109 MMHG | HEART RATE: 71 BPM

## 2020-05-23 VITALS — HEART RATE: 73 BPM | TEMPERATURE: 97.3 F | DIASTOLIC BLOOD PRESSURE: 44 MMHG | SYSTOLIC BLOOD PRESSURE: 111 MMHG

## 2020-05-23 VITALS — SYSTOLIC BLOOD PRESSURE: 124 MMHG | HEART RATE: 82 BPM | DIASTOLIC BLOOD PRESSURE: 52 MMHG | TEMPERATURE: 98.2 F

## 2020-05-23 VITALS — DIASTOLIC BLOOD PRESSURE: 54 MMHG | HEART RATE: 78 BPM | SYSTOLIC BLOOD PRESSURE: 139 MMHG | TEMPERATURE: 97.3 F

## 2020-05-23 LAB
ERYTHROCYTE [DISTWIDTH] IN BLOOD BY AUTOMATED COUNT: 13.8 % (ref 11.5–14.5)
HCT VFR BLD AUTO: 34.3 % (ref 42–52)
HGB BLD-MCNC: 11.2 G/DL (ref 13.5–18)
MCH RBC QN AUTO: 32 PG (ref 27–31)
MCHC RBC AUTO-ENTMCNC: 33 G/DL (ref 33–37)
MCV RBC AUTO: 99 FL (ref 80–100)
PLATELET # BLD AUTO: 176 K/MM3 (ref 130–400)
PMV BLD AUTO: 12.5 FL (ref 7.4–10.4)
RBC # BLD AUTO: 3.47 M/MM3 (ref 4.2–5.6)

## 2020-05-23 NOTE — NUR
Sejal received a call from patients son "Ananth," [phone] who expressed
concern about father. Son indicated that he has been unable to speak to his
father due to difficulty hearing, and that his father has been on a modified
diet due to not having his dentures. SEJAL spoke with patients nurse, and called
patients son back, and encouraged hi to bring patients dentures and hearing
aids, which son indicated he would have to locate. Sw provided support and
validated. Patient asked to be contacted about status and provided the above
listed phone number.

## 2020-05-23 NOTE — NUR
PATIENT HAD AN UNEVENTFUL NIGHT. PATIENT TOOK HIS HS PILLS WITHOUT DIFFICULTY.
PATIENT WAS CLEANED UP BY THE NURSE AIDE. PATIENT DID NOT HAVE DIALYSIS ON
5/22/20 SO ACCORDING TO THE PROGRESS NOTE, 05/23/20 HE IS SUPPOSE TO HAVE
DIALYSIS. PATIENT IS LOOKING AT PLACEMENT AT [facility] ACCORDING TO
DISCHARGE PLANNING NOTES. WILL UPDATE DAY SHIFT UPON THEIR ARRIVAL

## 2020-05-24 VITALS — SYSTOLIC BLOOD PRESSURE: 113 MMHG | HEART RATE: 77 BPM | TEMPERATURE: 98 F | DIASTOLIC BLOOD PRESSURE: 43 MMHG

## 2020-05-24 VITALS — DIASTOLIC BLOOD PRESSURE: 36 MMHG | SYSTOLIC BLOOD PRESSURE: 90 MMHG

## 2020-05-24 VITALS — TEMPERATURE: 99.1 F | HEART RATE: 75 BPM | SYSTOLIC BLOOD PRESSURE: 95 MMHG | DIASTOLIC BLOOD PRESSURE: 16 MMHG

## 2020-05-24 VITALS — SYSTOLIC BLOOD PRESSURE: 114 MMHG | DIASTOLIC BLOOD PRESSURE: 42 MMHG | TEMPERATURE: 97.9 F | HEART RATE: 80 BPM

## 2020-05-24 VITALS — SYSTOLIC BLOOD PRESSURE: 98 MMHG | TEMPERATURE: 97.6 F | HEART RATE: 74 BPM | DIASTOLIC BLOOD PRESSURE: 34 MMHG

## 2020-05-24 LAB
ALBUMIN SERPL-MCNC: 3.1 GM/DL (ref 3.5–5)
ANION GAP SERPL CALC-SCNC: 12 MMOL/L (ref 7–16)
BUN SERPL-MCNC: 88 MG/DL (ref 9–20)
CALCIUM SERPL-MCNC: 8.3 MG/DL (ref 8.4–10.2)
CHLORIDE SERPL-SCNC: 102 MMOL/L (ref 98–107)
CO2 SERPL-SCNC: 20 MMOL/L (ref 22–30)
CREAT SERPL-SCNC: 4.32 UMOL/L (ref 0.66–1.25)
GLUCOSE SERPL-MCNC: 102 MG/DL (ref 74–106)
PHOSPHATE SERPL-MCNC: 5.7 MG/DL (ref 2.5–4.5)
POTASSIUM SERPL-SCNC: 5 MMOL/L (ref 3.4–5)
SODIUM SERPL-SCNC: 133 MMOL/L (ref 137–145)

## 2020-05-24 NOTE — NUR
Patient has rested well throughout the night. Lawrence continues to drain clear,
yellow urine. Dialysis catheter noted to right chest. Left wrist fistula
noted. Incision is clean, dry, and intact. INT to right AC flushes with ease.
Patient denies any pain. Patient's son dropped off hearing aides and dentures
this evening. Patient encouraged to eat. Patient noted to be asleep at the
time when his son dropped belongings off. Will continue to monitor

## 2020-05-24 NOTE — NUR
Resting in bed. Assessment complete. Lungs diminished throughout. Patient
denies shortness of breath. Heart murmur heard. Bowels active. No edema noted.
INT right upper arm without complications. Right chest dialysis cath CDI. Left
forearm fistula bruit and thrill present. Patient blood pressures hypotensive.
Spoke with Dr. Bryn marie to holding lopressor and bumex tonight and resume
in morning. Patient denies other needs at this time. Call light in reach.

## 2020-05-24 NOTE — NUR
PT RETURNED FROM DIALYSIS @ 1045. LEFT BEFORE SHIFT CHANGE. AOX4. REPORTS
FATIGUE. DIALYSIS NURSE REPORTS PT HAD AN EPISODE OF SOB WITH MOANING BUT
WITHOUT CHEST PAIN AT END OF DIALYSIS. PT CURRENTLY DENIES ANY PAIN OR SOB.
DRANK HALF OF NEPRO SUPPLEMENT AND REFUSED BREAKFAST.

## 2020-05-25 VITALS — SYSTOLIC BLOOD PRESSURE: 71 MMHG | TEMPERATURE: 97.6 F | DIASTOLIC BLOOD PRESSURE: 21 MMHG | HEART RATE: 72 BPM

## 2020-05-25 VITALS — TEMPERATURE: 97.7 F | DIASTOLIC BLOOD PRESSURE: 37 MMHG | HEART RATE: 78 BPM | SYSTOLIC BLOOD PRESSURE: 114 MMHG

## 2020-05-25 VITALS — DIASTOLIC BLOOD PRESSURE: 40 MMHG | HEART RATE: 81 BPM | TEMPERATURE: 98 F | SYSTOLIC BLOOD PRESSURE: 115 MMHG

## 2020-05-25 VITALS — TEMPERATURE: 97.4 F | HEART RATE: 92 BPM | DIASTOLIC BLOOD PRESSURE: 35 MMHG | SYSTOLIC BLOOD PRESSURE: 99 MMHG

## 2020-05-25 VITALS — DIASTOLIC BLOOD PRESSURE: 34 MMHG | HEART RATE: 71 BPM | TEMPERATURE: 98.4 F | SYSTOLIC BLOOD PRESSURE: 98 MMHG

## 2020-05-25 VITALS — TEMPERATURE: 99.3 F | HEART RATE: 83 BPM | DIASTOLIC BLOOD PRESSURE: 31 MMHG | SYSTOLIC BLOOD PRESSURE: 98 MMHG

## 2020-05-25 VITALS — TEMPERATURE: 100 F | SYSTOLIC BLOOD PRESSURE: 116 MMHG | HEART RATE: 98 BPM | DIASTOLIC BLOOD PRESSURE: 42 MMHG

## 2020-05-25 LAB
ALBUMIN SERPL-MCNC: 2.9 GM/DL (ref 3.5–5)
ANION GAP SERPL CALC-SCNC: 9 MMOL/L (ref 7–16)
BUN SERPL-MCNC: 42 MG/DL (ref 9–20)
CALCIUM SERPL-MCNC: 7.9 MG/DL (ref 8.4–10.2)
CHLORIDE SERPL-SCNC: 103 MMOL/L (ref 98–107)
CO2 SERPL-SCNC: 23 MMOL/L (ref 22–30)
CREAT SERPL-SCNC: 3.08 UMOL/L (ref 0.66–1.25)
ERYTHROCYTE [DISTWIDTH] IN BLOOD BY AUTOMATED COUNT: 13.6 % (ref 11.5–14.5)
GLUCOSE SERPL-MCNC: 94 MG/DL (ref 74–106)
HCT VFR BLD AUTO: 32.7 % (ref 42–52)
HGB BLD-MCNC: 10.4 G/DL (ref 13.5–18)
MCH RBC QN AUTO: 32 PG (ref 27–31)
MCHC RBC AUTO-ENTMCNC: 32 G/DL (ref 33–37)
MCV RBC AUTO: 100 FL (ref 80–100)
PH UR STRIP.AUTO: 5 [PH] (ref 5–8)
PHOSPHATE SERPL-MCNC: 4.1 MG/DL (ref 2.5–4.5)
PLATELET # BLD AUTO: 145 K/MM3 (ref 130–400)
PMV BLD AUTO: 12.4 FL (ref 7.4–10.4)
POTASSIUM SERPL-SCNC: 4.5 MMOL/L (ref 3.4–5)
RBC # BLD AUTO: 3.27 M/MM3 (ref 4.2–5.6)
RBC # UR STRIP.AUTO: (no result) /UL
RBC # UR: >50 /HPF
SODIUM SERPL-SCNC: 135 MMOL/L (ref 137–145)
SP GR UR STRIP.AUTO: 1.02 (ref 1–1.03)
SQUAMOUS # URNS: (no result) /HPF
UA DIPSTICK PNL UR STRIP.AUTO: (no result)
URINE LEUKOCYTE ESTERASE: (no result)
URN COLLECT METHOD CLASS: (no result)

## 2020-05-25 NOTE — NUR
Patient BP hypotensive during night. Held metoprolol and bumex-okayd with Dr. Clemente-to be resumed this AM. Otherwise uneventful night. Resting in bed this
AM. Call light in reach.

## 2020-05-25 NOTE — NUR
PT HAD AN EPISODE OF EMESIS AFTER DINNER. HAD ONLY EATEN A FEW BITES OF THE
CHOPPED MEAT.  SON HAD ALREADY LEFT BEDSIDE. PRN ZOFRAN GIVEN. MONZON CATHETER
IRRIGATED PER DR HOLLINGSWORTH DUE TO LOW OUTPUT WITH BLADDER DISTENTION, LOWER ABD
PAIN PER PT AND TURBID URINE INDICATING FLOW OBSTRUCTION. SLOW FLOW NOTED.
REPORT GIVEN TO NIGHT SHIFT

## 2020-05-25 NOTE — NUR
Patient temp 100.0. Room temperature decreased and provided with PRN tylenol.
Patient ohara to dependent drainage. 150 ml of yellow cloudy urine emptied.
Denies other needs at this time.

## 2020-05-25 NOTE — NUR
PT REFUSING MEALS AND REQUIRES STRONG ENCOURAGEMENT TO ET ANYTHING INCLUDING
HIS FAVORED SWEETS. RFUSED BREAKFAST AND DRANK NEPRO 100MLS AND 50MLS OF
ORANGE JUICE. DENIES PAIN. REPORTS FATIGUE. HEARING AIDES BATTERY ONE IN EAR
BUT OTHER ONE IS CHARGING. MONZON INTACT AND DRAINING YELLOW WITH
LIGHT BROWN SEDIMENT. REMAINS ON 2L NC FOR COMFORT.

## 2020-05-25 NOTE — NUR
Resting in bed. Reports nausea has resolved. Assessment complete. Lungs
diminished throughout. Heart murmur heard. Bowels active. Pulses strong. No
edema noted at this time. SCDs in place. INT to right upper arm flushed
without complications. Right chest dialysis cath CDI. Left forearm fistula
bruit and thrill present. Lawrence to dependent drainage with yellow cloudy urine
present. Denies pain. Denies needs at this time. Call light in reach.

## 2020-05-26 VITALS — DIASTOLIC BLOOD PRESSURE: 46 MMHG | SYSTOLIC BLOOD PRESSURE: 115 MMHG | HEART RATE: 82 BPM | TEMPERATURE: 97.5 F

## 2020-05-26 VITALS — SYSTOLIC BLOOD PRESSURE: 110 MMHG | TEMPERATURE: 98.2 F | HEART RATE: 97 BPM | DIASTOLIC BLOOD PRESSURE: 54 MMHG

## 2020-05-26 VITALS — SYSTOLIC BLOOD PRESSURE: 99 MMHG | TEMPERATURE: 98.2 F | DIASTOLIC BLOOD PRESSURE: 42 MMHG | HEART RATE: 81 BPM

## 2020-05-26 VITALS — SYSTOLIC BLOOD PRESSURE: 95 MMHG | HEART RATE: 77 BPM | DIASTOLIC BLOOD PRESSURE: 30 MMHG | TEMPERATURE: 98.8 F

## 2020-05-26 VITALS — TEMPERATURE: 97.5 F | HEART RATE: 79 BPM | DIASTOLIC BLOOD PRESSURE: 44 MMHG | SYSTOLIC BLOOD PRESSURE: 109 MMHG

## 2020-05-26 LAB
ALBUMIN SERPL-MCNC: 2.8 GM/DL (ref 3.5–5)
ANION GAP SERPL CALC-SCNC: 8 MMOL/L (ref 7–16)
BASOPHILS # BLD: 0 10*3/UL (ref 0–0.2)
BASOPHILS NFR BLD AUTO: 0.2 % (ref 0–2)
BUN SERPL-MCNC: 72 MG/DL (ref 9–20)
CALCIUM SERPL-MCNC: 8 MG/DL (ref 8.4–10.2)
CHLORIDE SERPL-SCNC: 101 MMOL/L (ref 98–107)
CO2 SERPL-SCNC: 24 MMOL/L (ref 22–30)
CREAT SERPL-SCNC: 4.09 UMOL/L (ref 0.66–1.25)
EOSINOPHIL # BLD: 0.1 10*3/UL (ref 0–0.7)
EOSINOPHIL NFR BLD: 0.9 % (ref 0–4)
ERYTHROCYTE [DISTWIDTH] IN BLOOD BY AUTOMATED COUNT: 13.6 % (ref 11.5–14.5)
GLUCOSE SERPL-MCNC: 118 MG/DL (ref 74–106)
GRANULOCYTES # BLD AUTO: 88.3 % (ref 42.2–75.2)
HCT VFR BLD AUTO: 34 % (ref 42–52)
HGB BLD-MCNC: 10.7 G/DL (ref 13.5–18)
LYMPHOCYTES # BLD: 0.4 10*3/UL (ref 1.2–3.4)
LYMPHOCYTES NFR BLD: 3.8 % (ref 20–51)
MCH RBC QN AUTO: 32 PG (ref 27–31)
MCHC RBC AUTO-ENTMCNC: 32 G/DL (ref 33–37)
MCV RBC AUTO: 101 FL (ref 80–100)
MONOCYTES # BLD: 0.7 10*3/UL (ref 0.1–0.6)
MONOCYTES NFR BLD AUTO: 6.3 % (ref 1.7–9.3)
NEUTROPHILS # BLD: 9.4 10*3/UL (ref 1.4–6.5)
PHOSPHATE SERPL-MCNC: 3.9 MG/DL (ref 2.5–4.5)
PLATELET # BLD AUTO: 160 K/MM3 (ref 130–400)
PMV BLD AUTO: 12.7 FL (ref 7.4–10.4)
POTASSIUM SERPL-SCNC: 4.6 MMOL/L (ref 3.4–5)
RBC # BLD AUTO: 3.36 M/MM3 (ref 4.2–5.6)
SODIUM SERPL-SCNC: 133 MMOL/L (ref 137–145)

## 2020-05-26 NOTE — NUR
Patient had uneventful night. Lawrence to dependent drainage. Denies needs this
AM. Call light in reach.

## 2020-05-26 NOTE — NUR
PT resting in bed, stating that he was having pain at his catheter. Flushed
catheter with 10cc syringe, PT gave thumbs up after doing so. Bed alarms on,
call light within reach.

## 2020-05-26 NOTE — NUR
Gemini, RN with Dr. Clemente, informed DONTRELL that the patient may tentatively be
able to discharge Wednesday or Friday. DONTRELL notified Chelsea at [facility]
and faxed her updates and the patient's negative COVID results. SW awaiting
screen. DONTRELL contacted and updated the patient's son, Suleiman. SW to continue to
follow.

## 2020-05-26 NOTE — NUR
Patient has been sleeping off and on this morning.  Will call out and state he
cant breathe, saturations are assessed, lung sounds are unchanged from morning
assessment.  At times right after requesting nursing staff for breathing
issues he will be back asleep.  Son did call asking for an update and was
notified about this prior to him talking with the patient.  Son reports that
he has been noted to do this and was told it was related to the dementia.  Has
been encouraged to drink nepro supplements.

## 2020-05-26 NOTE — NUR
Resting in bed. Lawrence to dependent drainage without difficulties. Denies
needs. Call light in reach.

## 2020-05-27 VITALS — DIASTOLIC BLOOD PRESSURE: 39 MMHG | SYSTOLIC BLOOD PRESSURE: 116 MMHG | HEART RATE: 76 BPM | TEMPERATURE: 97.3 F

## 2020-05-27 VITALS — SYSTOLIC BLOOD PRESSURE: 105 MMHG | TEMPERATURE: 98 F | HEART RATE: 80 BPM | DIASTOLIC BLOOD PRESSURE: 39 MMHG

## 2020-05-27 VITALS — SYSTOLIC BLOOD PRESSURE: 126 MMHG | TEMPERATURE: 97.9 F | DIASTOLIC BLOOD PRESSURE: 42 MMHG | HEART RATE: 99 BPM

## 2020-05-27 VITALS — TEMPERATURE: 97.8 F | DIASTOLIC BLOOD PRESSURE: 45 MMHG | HEART RATE: 78 BPM | SYSTOLIC BLOOD PRESSURE: 120 MMHG

## 2020-05-27 VITALS — HEART RATE: 93 BPM | TEMPERATURE: 98.4 F | SYSTOLIC BLOOD PRESSURE: 108 MMHG | DIASTOLIC BLOOD PRESSURE: 44 MMHG

## 2020-05-27 VITALS — TEMPERATURE: 97.6 F | SYSTOLIC BLOOD PRESSURE: 111 MMHG | HEART RATE: 94 BPM | DIASTOLIC BLOOD PRESSURE: 43 MMHG

## 2020-05-27 LAB
ALBUMIN SERPL-MCNC: 2.8 GM/DL (ref 3.5–5)
ANION GAP SERPL CALC-SCNC: 10 MMOL/L (ref 7–16)
BASOPHILS # BLD: 0 10*3/UL (ref 0–0.2)
BASOPHILS NFR BLD AUTO: 0.2 % (ref 0–2)
BUN SERPL-MCNC: 87 MG/DL (ref 9–20)
CALCIUM SERPL-MCNC: 8 MG/DL (ref 8.4–10.2)
CHLORIDE SERPL-SCNC: 97 MMOL/L (ref 98–107)
CO2 SERPL-SCNC: 23 MMOL/L (ref 22–30)
CREAT SERPL-SCNC: 4.51 UMOL/L (ref 0.66–1.25)
EOSINOPHIL # BLD: 0.2 10*3/UL (ref 0–0.7)
EOSINOPHIL NFR BLD: 2.1 % (ref 0–4)
ERYTHROCYTE [DISTWIDTH] IN BLOOD BY AUTOMATED COUNT: 13.2 % (ref 11.5–14.5)
GLUCOSE SERPL-MCNC: 116 MG/DL (ref 74–106)
GRANULOCYTES # BLD AUTO: 81.3 % (ref 42.2–75.2)
HCT VFR BLD AUTO: 32.5 % (ref 42–52)
HGB BLD-MCNC: 10.3 G/DL (ref 13.5–18)
LYMPHOCYTES # BLD: 0.5 10*3/UL (ref 1.2–3.4)
LYMPHOCYTES NFR BLD: 5 % (ref 20–51)
MCH RBC QN AUTO: 31 PG (ref 27–31)
MCHC RBC AUTO-ENTMCNC: 32 G/DL (ref 33–37)
MCV RBC AUTO: 99 FL (ref 80–100)
MONOCYTES # BLD: 1 10*3/UL (ref 0.1–0.6)
MONOCYTES NFR BLD AUTO: 10.9 % (ref 1.7–9.3)
NEUTROPHILS # BLD: 7.8 10*3/UL (ref 1.4–6.5)
PHOSPHATE SERPL-MCNC: 4.4 MG/DL (ref 2.5–4.5)
PLATELET # BLD AUTO: 166 K/MM3 (ref 130–400)
PMV BLD AUTO: 12.7 FL (ref 7.4–10.4)
POTASSIUM SERPL-SCNC: 4.8 MMOL/L (ref 3.4–5)
RBC # BLD AUTO: 3.28 M/MM3 (ref 4.2–5.6)
SODIUM SERPL-SCNC: 130 MMOL/L (ref 137–145)

## 2020-05-27 NOTE — NUR
Dr. Clemente reports that the patient is not ready to discharge today. DONTRELL
contacted and faxed updates to Chelsea at [facility]. DONTRELL attempted to
contact and update the patient's son, Suleiman. DONTRELL left him a voicemail. SW to
continue to follow.

## 2020-05-27 NOTE — NUR
Received report from CIARRA Rivera. Pt resting in bed without c/o pain or
discomfort. NAD. Meds administered as scheduled. INT to SHELLI intact, flushed,
dressing CDI. Lawrence in place, draining cloudy yellow urine. Tele monitor in
place. bed alarm set. Needs met at this time. Call light within reach.

## 2020-05-27 NOTE — NUR
Patient is resting in bed leaning toward right side.  Eyes are closed, facial
expression is relaxed.  Call light and personal items are within reach.

## 2020-05-27 NOTE — NUR
PT IN BED WITH HOB ELEVATED TO 45 DEGREE ANGLE. PT IS PLEASANT, BUT HAS A
DIFFICULT TIME UNDERSTANDING WHAT IS BEING SAID TO HIM. PT IS NOW
RESTING/SLEEPING WITH BOTH EYES CLOSED, RESP EVEN AND UNLABORED. CALL LIGHT
WITHIN REACH.

## 2020-05-27 NOTE — NUR
Patient is resting on left side resting with eyes closed.  Did eat some yogurt
and drank nepro supplement.  Took medications without problem.  Call light and
personal items are within reach.

## 2020-05-28 VITALS — DIASTOLIC BLOOD PRESSURE: 56 MMHG | SYSTOLIC BLOOD PRESSURE: 133 MMHG | HEART RATE: 103 BPM | TEMPERATURE: 97.5 F

## 2020-05-28 VITALS — TEMPERATURE: 97.9 F | DIASTOLIC BLOOD PRESSURE: 50 MMHG | HEART RATE: 87 BPM | SYSTOLIC BLOOD PRESSURE: 115 MMHG

## 2020-05-28 VITALS — DIASTOLIC BLOOD PRESSURE: 47 MMHG | SYSTOLIC BLOOD PRESSURE: 117 MMHG | TEMPERATURE: 97.8 F | HEART RATE: 81 BPM

## 2020-05-28 VITALS — SYSTOLIC BLOOD PRESSURE: 111 MMHG | TEMPERATURE: 97.4 F | DIASTOLIC BLOOD PRESSURE: 48 MMHG | HEART RATE: 98 BPM

## 2020-05-28 VITALS — DIASTOLIC BLOOD PRESSURE: 43 MMHG | HEART RATE: 86 BPM | SYSTOLIC BLOOD PRESSURE: 127 MMHG | TEMPERATURE: 97.9 F

## 2020-05-28 VITALS — SYSTOLIC BLOOD PRESSURE: 127 MMHG | TEMPERATURE: 97.9 F | DIASTOLIC BLOOD PRESSURE: 43 MMHG | HEART RATE: 86 BPM

## 2020-05-28 VITALS — DIASTOLIC BLOOD PRESSURE: 39 MMHG | TEMPERATURE: 98.3 F | HEART RATE: 93 BPM | SYSTOLIC BLOOD PRESSURE: 116 MMHG

## 2020-05-28 VITALS — SYSTOLIC BLOOD PRESSURE: 151 MMHG | DIASTOLIC BLOOD PRESSURE: 60 MMHG | HEART RATE: 120 BPM

## 2020-05-28 LAB
ALBUMIN SERPL-MCNC: 2.7 GM/DL (ref 3.5–5)
ANION GAP SERPL CALC-SCNC: 8 MMOL/L (ref 7–16)
BASOPHILS # BLD: 0 10*3/UL (ref 0–0.2)
BASOPHILS NFR BLD AUTO: 0.2 % (ref 0–2)
BUN SERPL-MCNC: 58 MG/DL (ref 9–20)
CALCIUM SERPL-MCNC: 8 MG/DL (ref 8.4–10.2)
CHLORIDE SERPL-SCNC: 101 MMOL/L (ref 98–107)
CO2 SERPL-SCNC: 24 MMOL/L (ref 22–30)
CREAT SERPL-SCNC: 3.32 UMOL/L (ref 0.66–1.25)
EOSINOPHIL # BLD: 0.1 10*3/UL (ref 0–0.7)
EOSINOPHIL NFR BLD: 1.3 % (ref 0–4)
ERYTHROCYTE [DISTWIDTH] IN BLOOD BY AUTOMATED COUNT: 13.2 % (ref 11.5–14.5)
GLUCOSE SERPL-MCNC: 128 MG/DL (ref 74–106)
GRANULOCYTES # BLD AUTO: 84 % (ref 42.2–75.2)
HCT VFR BLD AUTO: 31.5 % (ref 42–52)
HGB BLD-MCNC: 10.3 G/DL (ref 13.5–18)
LYMPHOCYTES # BLD: 0.5 10*3/UL (ref 1.2–3.4)
LYMPHOCYTES NFR BLD: 5.3 % (ref 20–51)
MCH RBC QN AUTO: 33 PG (ref 27–31)
MCHC RBC AUTO-ENTMCNC: 33 G/DL (ref 33–37)
MCV RBC AUTO: 99 FL (ref 80–100)
MONOCYTES # BLD: 0.7 10*3/UL (ref 0.1–0.6)
MONOCYTES NFR BLD AUTO: 8.7 % (ref 1.7–9.3)
NEUTROPHILS # BLD: 7.2 10*3/UL (ref 1.4–6.5)
PHOSPHATE SERPL-MCNC: 3.6 MG/DL (ref 2.5–4.5)
PLATELET # BLD AUTO: 189 K/MM3 (ref 130–400)
PMV BLD AUTO: 12.5 FL (ref 7.4–10.4)
POTASSIUM SERPL-SCNC: 4.3 MMOL/L (ref 3.4–5)
RBC # BLD AUTO: 3.17 M/MM3 (ref 4.2–5.6)
SODIUM SERPL-SCNC: 133 MMOL/L (ref 137–145)

## 2020-05-28 NOTE — NUR
PT [place], PUT IN HEARING AIDS AND HEARING IMPROVED. PT DENIES PAIN OR
DISCOMFORT, ASSESSMENT PERFORMED, MEDICATIONS GIVEN, FRESH ICE WATER BROUGHT
IN, NO OTHER NEEDS AT THIS TIME.

## 2020-05-28 NOTE — NUR
Leyla, at [facility], reports that they still need to get interpretor
services and ST set up for the patient and that they would be able to accept
him tomorrow. SW attempted to contact Dr. Clemente to notify him of this. SW
left him a voicemail. SW updated the patient's RN. SW then contacted and
updated the patient's son, Suleiman. Suleiman was agreeable to this. He states that
him, the patient, and the patient's wife would like to continue dialysis for
now and not ready for hospice services yet. SW read the IM form outloud to
Suleiman. Suleiman verbalized understanding and gave SW approval to sign the form on
his behalf. SW to continue to follow.

## 2020-05-28 NOTE — NUR
The patient may tentatively be able to discharge today, 5/28. SW notified and
faxed updates to Leyla at [facility]. DONTRELL to continue to follow.

## 2020-05-28 NOTE — NUR
Received report from CIARRA Julio. Alert, NAD, denies any pain, SOB or discomfort at
this time. Meds administered as ordered. [id number]. Tele monitor in place,
leads checked. Lawrence catheter in place, secured to LL, draining cloudy yellow
urine. Personal items including hearing aid at bedside table. Nepro on table,
at this time consumed 25%. INT to RAC intact, flushed, dressing CDI. Dialysis
cath to rt upper chest intact, no complications observed. Fistula to Left
lower forearm with positive bruit and thrill, no dressing in place. Made pt
comfortable. Call light within reach.

## 2020-05-28 NOTE — NUR
PT IN BED SLEEPING, TALKED TO SON MULTIPLE TIMES DURING THE SHIFT. PT O2
STABLE AT 3L ON OXYMASK. PT DID NOT EAT LUNCH. NO OTHER NEEDS AT THIS TIME.

## 2020-05-28 NOTE — NUR
Pt had x1 small soft formed brown BM. Pt made no complaints during this shift.
Able to make needs known. Lawrence bag emptied and charted.  Report given to
CIARRA Julio.

## 2020-05-28 NOTE — NUR
PHYSICIAN HAD BEEN CONTACTED AND ORDERED NO CHANGE TO POC. SON,  PAOLA,
NOTIFIED OF SITUATION. GRATEFUL FOR UPDATE, NO OTHER NEEDS AT THIS TIME.
SOCIAL WORK CONFIRMED PLACEMENT AT [facility] TOMORROW.

## 2020-05-28 NOTE — NUR
PT REPORTS NOT BEING ABLE TO BREATH, RR 40 A MINUTE, PULSE OX 92% ON 3L, PULSE
120, /60. OXYMASK BROUGHT IN FOR MORE COMFORT SINCE PT GASPING THROUGH
MOUTH, STILL ON 3L. PT ASKED "PUT ME TO SLEEP". PT EXPRESSES DESIRE TO DIE.

## 2020-05-28 NOTE — NUR
PT USED CALL LIGHT AND REPORTED HE THOUGH HE NEEDED MORE OXYGEN, PT SATTING AT
100%, PT BOOSTED IN BED W/ HEAD UP, PLACED SCD'S ON PT.

## 2020-05-29 VITALS — DIASTOLIC BLOOD PRESSURE: 50 MMHG | HEART RATE: 100 BPM | TEMPERATURE: 97.5 F | SYSTOLIC BLOOD PRESSURE: 123 MMHG

## 2020-05-29 VITALS — HEART RATE: 92 BPM | DIASTOLIC BLOOD PRESSURE: 64 MMHG | SYSTOLIC BLOOD PRESSURE: 148 MMHG | TEMPERATURE: 98.1 F

## 2020-05-29 VITALS — HEART RATE: 97 BPM | TEMPERATURE: 97.6 F | DIASTOLIC BLOOD PRESSURE: 41 MMHG | SYSTOLIC BLOOD PRESSURE: 120 MMHG

## 2020-05-29 VITALS — DIASTOLIC BLOOD PRESSURE: 32 MMHG | SYSTOLIC BLOOD PRESSURE: 125 MMHG | HEART RATE: 87 BPM | TEMPERATURE: 98.3 F

## 2020-05-29 VITALS — DIASTOLIC BLOOD PRESSURE: 51 MMHG | SYSTOLIC BLOOD PRESSURE: 111 MMHG | HEART RATE: 93 BPM | TEMPERATURE: 97.6 F

## 2020-05-29 VITALS — HEART RATE: 108 BPM | TEMPERATURE: 97.9 F | SYSTOLIC BLOOD PRESSURE: 140 MMHG | DIASTOLIC BLOOD PRESSURE: 52 MMHG

## 2020-05-29 LAB
ALBUMIN SERPL-MCNC: 3 GM/DL (ref 3.5–5)
ANION GAP SERPL CALC-SCNC: 11 MMOL/L (ref 7–16)
BASOPHILS # BLD: 0 10*3/UL (ref 0–0.2)
BASOPHILS NFR BLD AUTO: 0.3 % (ref 0–2)
BUN SERPL-MCNC: 73 MG/DL (ref 9–20)
CALCIUM SERPL-MCNC: 8.4 MG/DL (ref 8.4–10.2)
CHLORIDE SERPL-SCNC: 100 MMOL/L (ref 98–107)
CO2 SERPL-SCNC: 22 MMOL/L (ref 22–30)
CREAT SERPL-SCNC: 4.08 UMOL/L (ref 0.66–1.25)
EOSINOPHIL # BLD: 0.1 10*3/UL (ref 0–0.7)
EOSINOPHIL NFR BLD: 0.8 % (ref 0–4)
ERYTHROCYTE [DISTWIDTH] IN BLOOD BY AUTOMATED COUNT: 13.1 % (ref 11.5–14.5)
GLUCOSE SERPL-MCNC: 109 MG/DL (ref 74–106)
GRANULOCYTES # BLD AUTO: 81.8 % (ref 42.2–75.2)
HCT VFR BLD AUTO: 31.4 % (ref 42–52)
HGB BLD-MCNC: 10.1 G/DL (ref 13.5–18)
LYMPHOCYTES # BLD: 0.6 10*3/UL (ref 1.2–3.4)
LYMPHOCYTES NFR BLD: 6 % (ref 20–51)
MCH RBC QN AUTO: 31 PG (ref 27–31)
MCHC RBC AUTO-ENTMCNC: 32 G/DL (ref 33–37)
MCV RBC AUTO: 98 FL (ref 80–100)
MONOCYTES # BLD: 1 10*3/UL (ref 0.1–0.6)
MONOCYTES NFR BLD AUTO: 10.7 % (ref 1.7–9.3)
NEUTROPHILS # BLD: 7.6 10*3/UL (ref 1.4–6.5)
PHOSPHATE SERPL-MCNC: 4.2 MG/DL (ref 2.5–4.5)
PLATELET # BLD AUTO: 223 K/MM3 (ref 130–400)
PMV BLD AUTO: 12.3 FL (ref 7.4–10.4)
POTASSIUM SERPL-SCNC: 4.9 MMOL/L (ref 3.4–5)
RBC # BLD AUTO: 3.22 M/MM3 (ref 4.2–5.6)
SODIUM SERPL-SCNC: 133 MMOL/L (ref 137–145)

## 2020-05-29 NOTE — NUR
Son had called earlier around 1730 stating he was fearful that his father had
fallen out of bed, I did explain to Bill that I was just moments prior in with
Jarred and that he was having a panic moment with shortness of breath due to
his oxygen being off.  Oxygen was replaced and did administer PRN dilaudid for
air hunger, this was effective.  Call light and personal items are within
reach.

## 2020-05-29 NOTE — NUR
Received report from CIARRA Rivera. PT resting in bed, NAD. Denies pain. Meds
administered without issue. Denies SOB, on 3LO2 via oxymask. Lawrence catheter
intact. Dialysis cath intact to rt upper chest. Fistula to LFA with positive
bruit and thrill. Tele monitor in place. Made pt comfortable. Call light
within reach.

## 2020-05-29 NOTE — NUR
Dr. Clemente informed DONTRELL that the patient will be able to discharge tomorrow
morning, 5/30, after dialysis. DONTRELL notified and faxed updates to Leyla at
[facility]. Leyla reports that they are able to accept the patient
tomorrow. DONTRELL contacted and updated the patient's son, Suleiman. Suleiman is agreeable
to the plan. SW to continue to follow.

## 2020-05-29 NOTE — NUR
Patient has been resting in bed, earlier this morning he alerted staff that he
felt as if he could not breathe, oxymax was in place and he was holding it to
his face tightly.  Repostioned patient to back and elevated head, he stated it
was ineffective.  Did provide 0.25 mg dose of PRN dilaudid, he did calm and
stated he was feeling better.  He did eat a small amount of applesauce and
drank half a nepro shake.  He was provided with ice water and call light is
within reach.

## 2020-05-29 NOTE — NUR
Assisted pt to BSC, had small amount of formed brown stool. Briefs changed.
Made pt comfortable. Meds administered. Bed alarm set. Call light within
reach.

## 2020-05-30 VITALS — SYSTOLIC BLOOD PRESSURE: 115 MMHG | DIASTOLIC BLOOD PRESSURE: 53 MMHG | HEART RATE: 92 BPM | TEMPERATURE: 98 F

## 2020-05-30 VITALS — HEART RATE: 93 BPM | TEMPERATURE: 97.7 F | DIASTOLIC BLOOD PRESSURE: 43 MMHG | SYSTOLIC BLOOD PRESSURE: 128 MMHG

## 2020-05-30 VITALS — HEART RATE: 93 BPM | TEMPERATURE: 97.6 F | SYSTOLIC BLOOD PRESSURE: 129 MMHG | DIASTOLIC BLOOD PRESSURE: 41 MMHG

## 2020-05-30 LAB
ALBUMIN SERPL-MCNC: 2.9 GM/DL (ref 3.5–5)
ANION GAP SERPL CALC-SCNC: 11 MMOL/L (ref 7–16)
BASOPHILS # BLD: 0 10*3/UL (ref 0–0.2)
BASOPHILS NFR BLD AUTO: 0.2 % (ref 0–2)
BUN SERPL-MCNC: 86 MG/DL (ref 9–20)
CALCIUM SERPL-MCNC: 8.6 MG/DL (ref 8.4–10.2)
CHLORIDE SERPL-SCNC: 100 MMOL/L (ref 98–107)
CO2 SERPL-SCNC: 22 MMOL/L (ref 22–30)
CREAT SERPL-SCNC: 4.92 UMOL/L (ref 0.66–1.25)
EOSINOPHIL # BLD: 0.1 10*3/UL (ref 0–0.7)
EOSINOPHIL NFR BLD: 1.5 % (ref 0–4)
ERYTHROCYTE [DISTWIDTH] IN BLOOD BY AUTOMATED COUNT: 13 % (ref 11.5–14.5)
GLUCOSE SERPL-MCNC: 84 MG/DL (ref 74–106)
GRANULOCYTES # BLD AUTO: 81.1 % (ref 42.2–75.2)
HCT VFR BLD AUTO: 30.4 % (ref 42–52)
HGB BLD-MCNC: 9.9 G/DL (ref 13.5–18)
LYMPHOCYTES # BLD: 0.7 10*3/UL (ref 1.2–3.4)
LYMPHOCYTES NFR BLD: 7.6 % (ref 20–51)
MCH RBC QN AUTO: 32 PG (ref 27–31)
MCHC RBC AUTO-ENTMCNC: 33 G/DL (ref 33–37)
MCV RBC AUTO: 98 FL (ref 80–100)
MONOCYTES # BLD: 0.8 10*3/UL (ref 0.1–0.6)
MONOCYTES NFR BLD AUTO: 9.3 % (ref 1.7–9.3)
NEUTROPHILS # BLD: 7 10*3/UL (ref 1.4–6.5)
PHOSPHATE SERPL-MCNC: 5.1 MG/DL (ref 2.5–4.5)
PLATELET # BLD AUTO: 248 K/MM3 (ref 130–400)
PMV BLD AUTO: 12 FL (ref 7.4–10.4)
POTASSIUM SERPL-SCNC: 4.9 MMOL/L (ref 3.4–5)
RBC # BLD AUTO: 3.09 M/MM3 (ref 4.2–5.6)
SODIUM SERPL-SCNC: 134 MMOL/L (ref 137–145)

## 2020-05-30 NOTE — NUR
Patient arrived back from dialysis at this time. He is stable. Per report he
was only able to tolerate 2 hours of dialysis, 100 ml removed during session.
He is now back in bed. Refused breakfast. No other needs at this time. Call
light is in reach.

## 2020-05-30 NOTE — NUR
Sejal received call from Charge nurse that Pt was ready to discharge. SEJAL faxed
updated from 5/28-5/30, COVId [id number]) and discharge paperwork to
[facility] at [facility]. SEJAL contacted patients nurse with discharge time,
and contacted patients son and informed him of discharge time.

## 2020-05-30 NOTE — NUR
PT DISCHARGED AT THIS TIME. LUPILLO PICKED HIM AT THE ER ENTRANCE.
BELONGING AND TRANSFER PAPERWORK WAS SENT WITH HIM. MONZON CATHETER AND IV WERE
REMOVED. DIALYSIS CATHETER SITE WAS CD&I, SO SIGNS OF BLEEDING. NO FURTHER
QUESTIONS OR CONCERNS.

## 2020-05-30 NOTE — NUR
Pt made no complaints during this shift. Slept throught the night. Meds
administered. Remained on 3LO2 via oxymask. Needs attended too. call light
within reach.

## 2020-05-30 NOTE — NUR
PT HAS REFUSED FOOD ALL SHIFT. STATES HE DOT WANT IT. REQUESTED TO LIE FLAT
AND STATES THAT HE IS COMFORTABLE. DENIES PAIN.